# Patient Record
Sex: FEMALE
[De-identification: names, ages, dates, MRNs, and addresses within clinical notes are randomized per-mention and may not be internally consistent; named-entity substitution may affect disease eponyms.]

---

## 2018-02-03 ENCOUNTER — HOSPITAL ENCOUNTER (OUTPATIENT)
Dept: HOSPITAL 95 - LAB SHORT | Age: 73
End: 2018-02-03
Attending: INTERNAL MEDICINE
Payer: COMMERCIAL

## 2018-02-03 DIAGNOSIS — Z12.11: Primary | ICD-10-CM

## 2018-02-03 PROCEDURE — G0328 FECAL BLOOD SCRN IMMUNOASSAY: HCPCS

## 2018-08-17 ENCOUNTER — HOSPITAL ENCOUNTER (OUTPATIENT)
Dept: HOSPITAL 95 - MOI MAM | Age: 73
Discharge: HOME | End: 2018-08-17
Attending: INTERNAL MEDICINE
Payer: COMMERCIAL

## 2018-08-17 DIAGNOSIS — C50.911: Primary | ICD-10-CM

## 2018-08-17 DIAGNOSIS — Z17.0: ICD-10-CM

## 2018-08-17 PROCEDURE — A4648 IMPLANTABLE TISSUE MARKER: HCPCS

## 2018-08-17 PROCEDURE — G0279 TOMOSYNTHESIS, MAMMO: HCPCS

## 2018-08-17 PROCEDURE — 0HBT3ZX EXCISION OF RIGHT BREAST, PERCUTANEOUS APPROACH, DIAGNOSTIC: ICD-10-PCS

## 2019-07-29 ENCOUNTER — HOSPITAL ENCOUNTER (OUTPATIENT)
Dept: HOSPITAL 95 - LAB | Age: 74
Discharge: HOME | End: 2019-07-29
Attending: NURSE PRACTITIONER
Payer: COMMERCIAL

## 2019-07-29 DIAGNOSIS — Z91.89: ICD-10-CM

## 2019-07-29 DIAGNOSIS — Z12.4: Primary | ICD-10-CM

## 2019-07-29 DIAGNOSIS — N95.1: ICD-10-CM

## 2019-07-29 DIAGNOSIS — N95.2: ICD-10-CM

## 2019-07-29 PROCEDURE — G0123 SCREEN CERV/VAG THIN LAYER: HCPCS

## 2019-07-31 LAB — OTHER STN SPEC: (no result)

## 2021-11-04 NOTE — NUR
11/04/21 0823 Eunice Samson
TETRACAINE AND PLEDGET PLACED IN RIGHT EYE BY [facility].CB AT [id number]